# Patient Record
(demographics unavailable — no encounter records)

---

## 2024-10-14 NOTE — HISTORY OF PRESENT ILLNESS
[FreeTextEntry1] : 3/12/2021 -The prior cecum polyp resection site was seen (scar).  There was a recurrent polyp about 1 cm (dhruv IIa).  This was removed by EMR technique.  It was raised with Hespan/MB/saline and removed en bloc. -2 smaller polyps were seen in the cecum and removed with a jumbo biopsy forceps.  2/20/2019 = A 20 mm sessile polyp was found in the cecum.  The polyp was removed by EMR technique in an en bloc fashion with a Eleview injection lift technique using hot snare.  Resection and retrieval were complete.  3 hemoclips were placed at the resection site for defect closure. -A 10 mm sessile polyp was found in the cecum.  The polyp was removed by using a hot snare.  Resection and retrieval were placed. -A previously placed Hemoclip was seen in the ascending colon.

## 2024-10-14 NOTE — ASSESSMENT
[FreeTextEntry1] : 63-year-old female with history of colon polyps removed by Endo mucosal resection technique who is due for a follow-up surveillance colonoscopy to check for recurrence of site and any new polyps.   We discussed the proposed Colonoscopy SIte surveillance procedure, preparation and alternatives.  The risks (bleeding, perforation, infection) and benefits were discussed with the patient in details.  Patient understands the risks/benefits and agreed to proceed with the planned procedure. She was advised that the bowel prep will need to be repeated.   Plan 1.  Colon surveillance with Dr. Vasques on 12/9/2024. 2.  Instructions to be mailed.    All questions answered.  Patient to call me with any questions.

## 2024-10-14 NOTE — REASON FOR VISIT
[Home] : at home, [unfilled] , at the time of the visit. [Medical Office: ([facility])___] : at the medical office located in  [Patient] : the patient [Self] : self [Follow-up] : a follow-up of an existing diagnosis [FreeTextEntry1] : for hx of colon polyps with EMR removal.